# Patient Record
(demographics unavailable — no encounter records)

---

## 2025-06-11 NOTE — PHYSICAL EXAM
[MA] : MA [FreeTextEntry2] : Richard [Appropriately responsive] : appropriately responsive [Alert] : alert [No Acute Distress] : no acute distress [No Lymphadenopathy] : no lymphadenopathy [Soft] : soft [Non-tender] : non-tender [Non-distended] : non-distended [No HSM] : No HSM [Oriented x3] : oriented x3 [No Mass] : no mass [No Lesions] : no lesions [Examination Of The Breasts] : a normal appearance [No Masses] : no breast masses were palpable [Labia Majora] : normal [Labia Minora] : normal [Normal] : normal [Uterine Adnexae] : normal

## 2025-06-11 NOTE — PROCEDURE
[Transvaginal OB Sonogram] : Transvaginal OB Sonogram [Yolk Sac] : yolk sac present [Intrauterine Pregnancy] : intrauterine pregnancy [Fetal Heart] : fetal heart present [Transvaginal OB Sonogram WNL] : Transvaginal OB Sonogram WNL [FreeTextEntry1] : single viabel iup crl 10.5 wks, no ff ,no masses, c/w lmp fam 1/2/26

## 2025-06-11 NOTE — HISTORY OF PRESENT ILLNESS
[FreeTextEntry1] : 26 y/o p3003 LMP 3/28/25 here for wwe and evaluation of secondary amenorrhea.  no bleeding, pain or discharge  nsd x 3, largest 7-13 lbs  no med or surgical hx  nkda  non smoker  dy neg

## 2025-06-11 NOTE — PLAN
[FreeTextEntry1] : 28 y/o p3 with normal exam and secondary amenorrhea stable nestabs ref aneuploiyd testing nut, cousnelling labs sent from office declines expanded carrier testing f/u 4 wks